# Patient Record
Sex: FEMALE | Race: WHITE | NOT HISPANIC OR LATINO | URBAN - METROPOLITAN AREA
[De-identification: names, ages, dates, MRNs, and addresses within clinical notes are randomized per-mention and may not be internally consistent; named-entity substitution may affect disease eponyms.]

---

## 2021-03-23 ENCOUNTER — TELEHEALTH PROVIDED OTHER THAN IN PATIENT'S HOME (OUTPATIENT)
Dept: URBAN - METROPOLITAN AREA TELEHEALTH 12 | Facility: TELEHEALTH | Age: 51
End: 2021-03-23
Payer: COMMERCIAL

## 2021-03-23 VITALS — HEIGHT: 68 IN | WEIGHT: 270 LBS

## 2021-03-23 DIAGNOSIS — R13.10 DYSPHAGIA, UNSPECIFIED: ICD-10-CM

## 2021-03-23 DIAGNOSIS — K21.9 GASTRO-ESOPHAGEAL REFLUX DISEASE WITHOUT ESOPHAGITIS: ICD-10-CM

## 2021-03-23 PROCEDURE — 99244 OFF/OP CNSLTJ NEW/EST MOD 40: CPT | Mod: 95 | Performed by: PHYSICIAN ASSISTANT

## 2021-03-23 NOTE — SERVICEHPINOTES
PATIENT VERIFIED BY DATE OF BIRTH AND NAME. Patient has been consented for this telecommunication visit.   CATINA HOOVER   is a   50   year old    female who is being seen in consultation at the request of   PATRICIA KHANNA   for an EGD. Has been on Protonix since 2006 which worked well for her until about two months ago. In 2006, she had a food impaction which started the process and she was dx with esophageal ulcers. Two months ago, she started to feel heartburn when laying down. She would also wake up d/t heartburn/reflux. She had gained 40 lbs and wonders if that is why her symptoms had gotten worse. Not under increased stress. No regular NSAID use. Did have dysphagia (mild) until the pantoprazole was increased. Stable appetite. No stomach pain, wt loss, or early satiety. No known heart issues.

## 2021-04-20 ENCOUNTER — OFFICE (OUTPATIENT)
Dept: URBAN - METROPOLITAN AREA CLINIC 34 | Facility: CLINIC | Age: 51
End: 2021-04-20
Payer: COMMERCIAL

## 2021-04-20 DIAGNOSIS — Z11.59 ENCOUNTER FOR SCREENING FOR OTHER VIRAL DISEASES: ICD-10-CM

## 2021-04-20 PROCEDURE — 99211 OFF/OP EST MAY X REQ PHY/QHP: CPT | Mod: 25,CS | Performed by: INTERNAL MEDICINE

## 2021-04-20 PROCEDURE — 99211 OFF/OP EST MAY X REQ PHY/QHP: CPT | Mod: CS,25 | Performed by: INTERNAL MEDICINE

## 2021-04-22 ENCOUNTER — OFFICE (OUTPATIENT)
Dept: URBAN - METROPOLITAN AREA CLINIC 98 | Facility: CLINIC | Age: 51
End: 2021-04-22
Payer: COMMERCIAL

## 2021-04-22 ENCOUNTER — OFFICE (OUTPATIENT)
Dept: URBAN - METROPOLITAN AREA PATHOLOGY 18 | Facility: PATHOLOGY | Age: 51
End: 2021-04-22
Payer: COMMERCIAL

## 2021-04-22 VITALS
HEART RATE: 78 BPM | DIASTOLIC BLOOD PRESSURE: 88 MMHG | HEIGHT: 68 IN | DIASTOLIC BLOOD PRESSURE: 97 MMHG | HEIGHT: 68 IN | HEART RATE: 78 BPM | TEMPERATURE: 97.5 F | WEIGHT: 270 LBS | OXYGEN SATURATION: 99 % | RESPIRATION RATE: 14 BRPM | SYSTOLIC BLOOD PRESSURE: 135 MMHG | RESPIRATION RATE: 14 BRPM | DIASTOLIC BLOOD PRESSURE: 89 MMHG | HEART RATE: 100 BPM | SYSTOLIC BLOOD PRESSURE: 132 MMHG | DIASTOLIC BLOOD PRESSURE: 69 MMHG | TEMPERATURE: 97.9 F | SYSTOLIC BLOOD PRESSURE: 133 MMHG | DIASTOLIC BLOOD PRESSURE: 69 MMHG | RESPIRATION RATE: 16 BRPM | DIASTOLIC BLOOD PRESSURE: 97 MMHG | SYSTOLIC BLOOD PRESSURE: 134 MMHG | OXYGEN SATURATION: 97 % | OXYGEN SATURATION: 96 % | OXYGEN SATURATION: 98 % | RESPIRATION RATE: 12 BRPM | WEIGHT: 270 LBS | OXYGEN SATURATION: 97 % | OXYGEN SATURATION: 96 % | DIASTOLIC BLOOD PRESSURE: 83 MMHG | OXYGEN SATURATION: 98 % | RESPIRATION RATE: 12 BRPM | SYSTOLIC BLOOD PRESSURE: 135 MMHG | HEART RATE: 82 BPM | HEART RATE: 82 BPM | OXYGEN SATURATION: 99 % | TEMPERATURE: 97.9 F | DIASTOLIC BLOOD PRESSURE: 89 MMHG | DIASTOLIC BLOOD PRESSURE: 91 MMHG | SYSTOLIC BLOOD PRESSURE: 133 MMHG | HEART RATE: 84 BPM | SYSTOLIC BLOOD PRESSURE: 129 MMHG | DIASTOLIC BLOOD PRESSURE: 83 MMHG | RESPIRATION RATE: 16 BRPM | SYSTOLIC BLOOD PRESSURE: 134 MMHG | SYSTOLIC BLOOD PRESSURE: 129 MMHG | HEART RATE: 84 BPM | TEMPERATURE: 97.5 F | SYSTOLIC BLOOD PRESSURE: 132 MMHG | HEART RATE: 100 BPM | DIASTOLIC BLOOD PRESSURE: 91 MMHG | DIASTOLIC BLOOD PRESSURE: 88 MMHG

## 2021-04-22 DIAGNOSIS — K22.8 OTHER SPECIFIED DISEASES OF ESOPHAGUS: ICD-10-CM

## 2021-04-22 DIAGNOSIS — R13.10 DYSPHAGIA, UNSPECIFIED: ICD-10-CM

## 2021-04-22 DIAGNOSIS — K44.9 DIAPHRAGMATIC HERNIA WITHOUT OBSTRUCTION OR GANGRENE: ICD-10-CM

## 2021-04-22 DIAGNOSIS — K22.2 ESOPHAGEAL OBSTRUCTION: ICD-10-CM

## 2021-04-22 DIAGNOSIS — K31.89 OTHER DISEASES OF STOMACH AND DUODENUM: ICD-10-CM

## 2021-04-22 DIAGNOSIS — K21.00 GASTRO-ESOPHAGEAL REFLUX DISEASE WITH ESOPHAGITIS, WITHOUT B: ICD-10-CM

## 2021-04-22 DIAGNOSIS — K21.9 GASTRO-ESOPHAGEAL REFLUX DISEASE WITHOUT ESOPHAGITIS: ICD-10-CM

## 2021-04-22 DIAGNOSIS — K29.60 OTHER GASTRITIS WITHOUT BLEEDING: ICD-10-CM

## 2021-04-22 PROCEDURE — 88313 SPECIAL STAINS GROUP 2: CPT | Performed by: PATHOLOGY

## 2021-04-22 PROCEDURE — 88342 IMHCHEM/IMCYTCHM 1ST ANTB: CPT | Performed by: PATHOLOGY

## 2021-04-22 PROCEDURE — 00731 ANES UPR GI NDSC PX NOS: CPT | Mod: AA,P3,QS

## 2021-04-22 PROCEDURE — 88305 TISSUE EXAM BY PATHOLOGIST: CPT | Performed by: PATHOLOGY

## 2021-04-22 PROCEDURE — 00731 ANES UPR GI NDSC PX NOS: CPT | Mod: P3,AA,QS

## 2021-04-22 PROCEDURE — 88312 SPECIAL STAINS GROUP 1: CPT | Performed by: PATHOLOGY

## 2021-06-23 ENCOUNTER — TELEHEALTH PROVIDED OTHER THAN IN PATIENT'S HOME (OUTPATIENT)
Dept: URBAN - METROPOLITAN AREA TELEHEALTH 3 | Facility: TELEHEALTH | Age: 51
End: 2021-06-23
Payer: COMMERCIAL

## 2021-06-23 VITALS — WEIGHT: 275 LBS | HEIGHT: 68 IN

## 2021-06-23 DIAGNOSIS — K21.9 GASTRO-ESOPHAGEAL REFLUX DISEASE WITHOUT ESOPHAGITIS: ICD-10-CM

## 2021-06-23 DIAGNOSIS — Z12.11 ENCOUNTER FOR SCREENING FOR MALIGNANT NEOPLASM OF COLON: ICD-10-CM

## 2021-06-23 DIAGNOSIS — R13.10 DYSPHAGIA, UNSPECIFIED: ICD-10-CM

## 2021-06-23 PROCEDURE — 99214 OFFICE O/P EST MOD 30 MIN: CPT | Mod: 95 | Performed by: INTERNAL MEDICINE

## 2021-06-23 NOTE — SERVICEHPINOTES
PATIENT VERIFIED BY DATE OF BIRTH AND NAME. Patient has been consented for this telecommunication visit.   Ms. Long is following up today after her EGD two months ago when she was found to have a Schatski's ring and a sliding hiatal hernia.  The was dilated with a 20 mm balloon and GE biopsies showed reflux changes without IM.  Her Heartburn is still an occasiouanl problem where she wakes up at night with breakthrough symptoms.  She has been taking the PPI BID still but taking the second dose 1 hr before bed.  She has not had colonoscopy for screening.  She mentions having constipation since she was a child.  She takes Miralax as needed.  Drinks extra water before bed, which may be contributing to the nighttime reflux.  She can go a week without BMs.  If she takes Miralax daily she will do ok with regularity for a week but then develops diarrhea so hasn't found a good balance with this yet.  She tells me that the reason she hasn't scheduled a colonoscopy yet is that she has been afraid of not eating solid food, as this had triggered her heartburn in the past.  No family hx of colon cancer.          BRROS per HPI, otherwise negative.

## 2021-06-25 ENCOUNTER — OFFICE (OUTPATIENT)
Dept: URBAN - METROPOLITAN AREA CLINIC 102 | Facility: CLINIC | Age: 51
End: 2021-06-25

## 2021-06-25 PROCEDURE — 00038: CPT | Performed by: INTERNAL MEDICINE

## 2021-07-06 ENCOUNTER — OFFICE (OUTPATIENT)
Dept: URBAN - METROPOLITAN AREA CLINIC 98 | Facility: CLINIC | Age: 51
End: 2021-07-06
Payer: COMMERCIAL

## 2021-07-06 VITALS
HEART RATE: 83 BPM | DIASTOLIC BLOOD PRESSURE: 80 MMHG | HEART RATE: 73 BPM | RESPIRATION RATE: 16 BRPM | HEIGHT: 68 IN | DIASTOLIC BLOOD PRESSURE: 67 MMHG | TEMPERATURE: 97.2 F | RESPIRATION RATE: 25 BRPM | SYSTOLIC BLOOD PRESSURE: 141 MMHG | HEIGHT: 68 IN | HEART RATE: 79 BPM | WEIGHT: 275 LBS | TEMPERATURE: 97.3 F | OXYGEN SATURATION: 98 % | HEART RATE: 71 BPM | OXYGEN SATURATION: 97 % | DIASTOLIC BLOOD PRESSURE: 80 MMHG | HEART RATE: 73 BPM | DIASTOLIC BLOOD PRESSURE: 85 MMHG | OXYGEN SATURATION: 98 % | RESPIRATION RATE: 15 BRPM | SYSTOLIC BLOOD PRESSURE: 123 MMHG | SYSTOLIC BLOOD PRESSURE: 141 MMHG | DIASTOLIC BLOOD PRESSURE: 71 MMHG | SYSTOLIC BLOOD PRESSURE: 142 MMHG | DIASTOLIC BLOOD PRESSURE: 55 MMHG | TEMPERATURE: 97.3 F | DIASTOLIC BLOOD PRESSURE: 55 MMHG | HEART RATE: 80 BPM | SYSTOLIC BLOOD PRESSURE: 142 MMHG | SYSTOLIC BLOOD PRESSURE: 124 MMHG | OXYGEN SATURATION: 96 % | HEART RATE: 71 BPM | HEART RATE: 79 BPM | RESPIRATION RATE: 16 BRPM | DIASTOLIC BLOOD PRESSURE: 83 MMHG | RESPIRATION RATE: 10 BRPM | RESPIRATION RATE: 25 BRPM | RESPIRATION RATE: 21 BRPM | RESPIRATION RATE: 10 BRPM | HEART RATE: 82 BPM | HEART RATE: 83 BPM | RESPIRATION RATE: 15 BRPM | HEART RATE: 82 BPM | SYSTOLIC BLOOD PRESSURE: 115 MMHG | DIASTOLIC BLOOD PRESSURE: 83 MMHG | SYSTOLIC BLOOD PRESSURE: 129 MMHG | OXYGEN SATURATION: 96 % | HEART RATE: 80 BPM | SYSTOLIC BLOOD PRESSURE: 123 MMHG | DIASTOLIC BLOOD PRESSURE: 67 MMHG | OXYGEN SATURATION: 90 % | DIASTOLIC BLOOD PRESSURE: 71 MMHG | SYSTOLIC BLOOD PRESSURE: 115 MMHG | OXYGEN SATURATION: 90 % | SYSTOLIC BLOOD PRESSURE: 124 MMHG | DIASTOLIC BLOOD PRESSURE: 85 MMHG | WEIGHT: 275 LBS | TEMPERATURE: 97.2 F | RESPIRATION RATE: 21 BRPM | SYSTOLIC BLOOD PRESSURE: 129 MMHG | OXYGEN SATURATION: 97 %

## 2021-07-06 DIAGNOSIS — Z12.11 ENCOUNTER FOR SCREENING FOR MALIGNANT NEOPLASM OF COLON: ICD-10-CM

## 2021-07-06 PROCEDURE — 00812 ANES LWR INTST SCR COLSC: CPT | Mod: QS,Q6,AA,P3

## 2021-07-06 PROCEDURE — 00812 ANES LWR INTST SCR COLSC: CPT | Mod: AA,P3,Q6,QS
